# Patient Record
Sex: FEMALE | Race: ASIAN | Employment: OTHER | URBAN - METROPOLITAN AREA
[De-identification: names, ages, dates, MRNs, and addresses within clinical notes are randomized per-mention and may not be internally consistent; named-entity substitution may affect disease eponyms.]

---

## 2020-01-02 ENCOUNTER — OFFICE VISIT - HEALTHEAST (OUTPATIENT)
Dept: INTERNAL MEDICINE | Facility: CLINIC | Age: 55
End: 2020-01-02

## 2020-01-02 DIAGNOSIS — Z12.31 VISIT FOR SCREENING MAMMOGRAM: ICD-10-CM

## 2020-01-02 DIAGNOSIS — R10.13 EPIGASTRIC PAIN: ICD-10-CM

## 2020-01-02 DIAGNOSIS — R63.4 WEIGHT LOSS: ICD-10-CM

## 2020-01-02 DIAGNOSIS — Z12.11 SCREEN FOR COLON CANCER: ICD-10-CM

## 2020-01-02 DIAGNOSIS — R14.0 BLOATING: ICD-10-CM

## 2020-01-02 LAB
ALBUMIN SERPL-MCNC: 4.8 G/DL (ref 3.5–5)
ALP SERPL-CCNC: 75 U/L (ref 45–120)
ALT SERPL W P-5'-P-CCNC: 13 U/L (ref 0–45)
AMYLASE SERPL-CCNC: 72 U/L (ref 5–120)
ANION GAP SERPL CALCULATED.3IONS-SCNC: 13 MMOL/L (ref 5–18)
AST SERPL W P-5'-P-CCNC: 18 U/L (ref 0–40)
BASOPHILS # BLD AUTO: 0 THOU/UL (ref 0–0.2)
BASOPHILS NFR BLD AUTO: 0 % (ref 0–2)
BILIRUB SERPL-MCNC: 0.6 MG/DL (ref 0–1)
BUN SERPL-MCNC: 19 MG/DL (ref 8–22)
C REACTIVE PROTEIN LHE: <0.1 MG/DL (ref 0–0.8)
CALCIUM SERPL-MCNC: 10.1 MG/DL (ref 8.5–10.5)
CHLORIDE BLD-SCNC: 105 MMOL/L (ref 98–107)
CO2 SERPL-SCNC: 23 MMOL/L (ref 22–31)
CREAT SERPL-MCNC: 0.82 MG/DL (ref 0.6–1.1)
EOSINOPHIL # BLD AUTO: 0 THOU/UL (ref 0–0.4)
EOSINOPHIL NFR BLD AUTO: 0 % (ref 0–6)
ERYTHROCYTE [DISTWIDTH] IN BLOOD BY AUTOMATED COUNT: 11.9 % (ref 11–14.5)
ERYTHROCYTE [SEDIMENTATION RATE] IN BLOOD BY WESTERGREN METHOD: 13 MM/HR (ref 0–20)
GFR SERPL CREATININE-BSD FRML MDRD: >60 ML/MIN/1.73M2
GLUCOSE BLD-MCNC: 132 MG/DL (ref 70–125)
HCT VFR BLD AUTO: 38.3 % (ref 35–47)
HGB BLD-MCNC: 13.2 G/DL (ref 12–16)
LIPASE SERPL-CCNC: 30 U/L (ref 0–52)
LYMPHOCYTES # BLD AUTO: 2.2 THOU/UL (ref 0.8–4.4)
LYMPHOCYTES NFR BLD AUTO: 30 % (ref 20–40)
MCH RBC QN AUTO: 31.7 PG (ref 27–34)
MCHC RBC AUTO-ENTMCNC: 34.5 G/DL (ref 32–36)
MCV RBC AUTO: 92 FL (ref 80–100)
MONOCYTES # BLD AUTO: 0.3 THOU/UL (ref 0–0.9)
MONOCYTES NFR BLD AUTO: 5 % (ref 2–10)
NEUTROPHILS # BLD AUTO: 4.6 THOU/UL (ref 2–7.7)
NEUTROPHILS NFR BLD AUTO: 65 % (ref 50–70)
PLATELET # BLD AUTO: 307 THOU/UL (ref 140–440)
PMV BLD AUTO: 9.7 FL (ref 8.5–12.5)
POTASSIUM BLD-SCNC: 3.9 MMOL/L (ref 3.5–5)
PROT SERPL-MCNC: 8 G/DL (ref 6–8)
RBC # BLD AUTO: 4.17 MILL/UL (ref 3.8–5.4)
SODIUM SERPL-SCNC: 141 MMOL/L (ref 136–145)
TSH SERPL DL<=0.005 MIU/L-ACNC: 1.08 UIU/ML (ref 0.3–5)
WBC: 7.2 THOU/UL (ref 4–11)

## 2020-01-02 ASSESSMENT — MIFFLIN-ST. JEOR: SCORE: 1010.47

## 2020-01-03 LAB — HBV SURFACE AG SERPL QL IA: NEGATIVE

## 2020-01-06 LAB — H PYLORI AG STL QL IA: NEGATIVE

## 2020-01-09 ENCOUNTER — OFFICE VISIT - HEALTHEAST (OUTPATIENT)
Dept: INTERNAL MEDICINE | Facility: CLINIC | Age: 55
End: 2020-01-09

## 2020-01-09 DIAGNOSIS — F41.1 GAD (GENERALIZED ANXIETY DISORDER): ICD-10-CM

## 2020-01-09 DIAGNOSIS — K30 FUNCTIONAL DYSPEPSIA: ICD-10-CM

## 2020-01-09 DIAGNOSIS — Z78.0 MENOPAUSE: ICD-10-CM

## 2020-03-31 ENCOUNTER — OFFICE VISIT - HEALTHEAST (OUTPATIENT)
Dept: INTERNAL MEDICINE | Facility: CLINIC | Age: 55
End: 2020-03-31

## 2020-03-31 DIAGNOSIS — K29.50 CHRONIC GASTRITIS WITHOUT BLEEDING, UNSPECIFIED GASTRITIS TYPE: ICD-10-CM

## 2020-03-31 DIAGNOSIS — R10.13 ABDOMINAL PAIN, EPIGASTRIC: ICD-10-CM

## 2020-03-31 DIAGNOSIS — R68.81 EARLY SATIETY: ICD-10-CM

## 2020-03-31 DIAGNOSIS — R63.4 WEIGHT LOSS: ICD-10-CM

## 2020-04-07 ENCOUNTER — RECORDS - HEALTHEAST (OUTPATIENT)
Dept: ADMINISTRATIVE | Facility: OTHER | Age: 55
End: 2020-04-07

## 2020-04-14 ENCOUNTER — RECORDS - HEALTHEAST (OUTPATIENT)
Dept: ADMINISTRATIVE | Facility: OTHER | Age: 55
End: 2020-04-14

## 2020-04-28 ENCOUNTER — RECORDS - HEALTHEAST (OUTPATIENT)
Dept: ADMINISTRATIVE | Facility: OTHER | Age: 55
End: 2020-04-28

## 2020-07-07 ENCOUNTER — AMBULATORY - HEALTHEAST (OUTPATIENT)
Dept: INTERNAL MEDICINE | Facility: CLINIC | Age: 55
End: 2020-07-07

## 2021-03-04 ENCOUNTER — DOCUMENTATION ONLY (OUTPATIENT)
Dept: CARE COORDINATION | Facility: CLINIC | Age: 56
End: 2021-03-04

## 2021-03-07 ENCOUNTER — HEALTH MAINTENANCE LETTER (OUTPATIENT)
Age: 56
End: 2021-03-07

## 2021-03-08 ENCOUNTER — TELEPHONE (OUTPATIENT)
Dept: DERMATOLOGY | Facility: CLINIC | Age: 56
End: 2021-03-08

## 2021-03-08 ENCOUNTER — VIRTUAL VISIT (OUTPATIENT)
Dept: DERMATOLOGY | Facility: CLINIC | Age: 56
End: 2021-03-08
Payer: COMMERCIAL

## 2021-03-08 DIAGNOSIS — B02.9 HERPES ZOSTER WITHOUT COMPLICATION: Primary | ICD-10-CM

## 2021-03-08 PROCEDURE — 99204 OFFICE O/P NEW MOD 45 MIN: CPT | Mod: 95 | Performed by: DERMATOLOGY

## 2021-03-08 RX ORDER — VALACYCLOVIR HYDROCHLORIDE 1 G/1
1000 TABLET, FILM COATED ORAL 3 TIMES DAILY
Qty: 21 TABLET | Refills: 1 | Status: SHIPPED | OUTPATIENT
Start: 2021-03-08 | End: 2021-09-21

## 2021-03-08 RX ORDER — VALACYCLOVIR HYDROCHLORIDE 1 G/1
1000 TABLET, FILM COATED ORAL 2 TIMES DAILY
Qty: 14 TABLET | Refills: 1 | Status: SHIPPED | OUTPATIENT
Start: 2021-03-08 | End: 2021-03-08

## 2021-03-08 ASSESSMENT — PAIN SCALES - GENERAL: PAINLEVEL: NO PAIN (0)

## 2021-03-08 NOTE — LETTER
3/8/2021       RE: Donna Hays  [address]  [facility] [address]     Dear Colleague,    Thank you for referring your patient, Donna Hays, to the [facility] [place] at [facility]. Please see a copy of my visit note below.    [facility] Dermatology Note  Encounter Date: Mar 8, 2021  Store-and-Forward and Telephone ([phone]). Location of teledermatologist: [facility] [place].  Start time: 12:53. End time: 1:17.    Dermatology Problem List:  1. Suspected herpes zoster: on right posterior thigh; valacyclovir 1000 mg TID x7 days    ____________________________________________    Assessment & Plan:     1. Herpetic eruption on thigh: given concomitant eruption on buttock, suspect interrupted dermatomal distribution of zoster over simplex, so will treat at higher dose valacyclovir. Discussed what is known about pathophysiology and risk of recurrence. Advised against use of topical acyclovir as it is generally ineffective.  - start valacyclovir 1000 mg TID x7 days. If still present, can extend course an additional 7 days    Procedures Performed:    None    Follow-up: PRN    Staff:     Brett Wick MD   of Dermatology  Department of Dermatology  [facility]    ____________________________________________    CC: Derm Problem (Donna is concerned about a rash on her right thigh and buttock.)    HPI:  Ms. Donna Hays is a(n) 55 year old female who presents today as a new patient for rash    Rash started 8-9 days ago with small red spot on thigh - itchy  - few days later, developed blisters  - 2 days started Chinese herb cream  - not painful  - also has similar rash on same side on buttock    Patient is otherwise feeling well, without additional skin concerns.    Labs Reviewed:  N/A    Physical Exam:  Vitals: There were no vitals taken  for this visit.  SKIN: Teledermatology photos were reviewed; image quality and interpretability: acceptable. Image date: 3/3/21, 3/6/21.  - grouped erythematous vesicles/shallow erosions on the posterior thigh  - No other lesions of concern on areas examined.     Medications:  No current outpatient medications on file.     No current facility-administered medications for this visit.       Past Medical/Surgical History:   There is no problem list on file for this patient.    No past medical history on file.    CC Beto Marie MD  No address on file on close of this encounter.      Again, thank you for allowing me to participate in the care of your patient.      Sincerely,    Brett Wick MD

## 2021-03-08 NOTE — TELEPHONE ENCOUNTER
[facility] Call Center    Phone Message    May a detailed message be left on voicemail: yes     Reason for Call: Medication Question or concern regarding medication   Prescription Clarification  Name of Medication: valACYclovir (VALTREX) 1000 mg tablet  Prescribing Provider: Dr. Wick   Pharmacy: [facility]   What on the order needs clarification? Pharm called and needs some clarification on this medication. They received two prescription with two different directions. Please call them back to discuss. Thanks      Action Taken: Message routed to:  [facility] DERM    Travel Screening: Not Applicable

## 2021-03-08 NOTE — LETTER
Date:March 22, 2021      Patient was self referred, no letter generated. Do not send.        [facility] Health Information

## 2021-03-08 NOTE — PROGRESS NOTES
[facility] Dermatology Note  Encounter Date: Mar 8, 2021  Store-and-Forward and Telephone ([phone]). Location of teledermatologist: [facility] [place].  Start time: 12:53. End time: 1:17.    Dermatology Problem List:  1. Suspected herpes zoster: on right posterior thigh; valacyclovir 1000 mg TID x7 days    ____________________________________________    Assessment & Plan:     1. Herpetic eruption on thigh: given concomitant eruption on buttock, suspect interrupted dermatomal distribution of zoster over simplex, so will treat at higher dose valacyclovir. Discussed what is known about pathophysiology and risk of recurrence. Advised against use of topical acyclovir as it is generally ineffective.  - start valacyclovir 1000 mg TID x7 days. If still present, can extend course an additional 7 days    Procedures Performed:    None    Follow-up: PRN    Staff:     Brett Wick MD   of Dermatology  Department of Dermatology  [facility]    ____________________________________________    CC: Derm Problem (Donna is concerned about a rash on her right thigh and buttock.)    HPI:  Ms. Donna Hays is a(n) 55 year old female who presents today as a new patient for rash    Rash started 8-9 days ago with small red spot on thigh - itchy  - few days later, developed blisters  - 2 days started Chinese herb cream  - not painful  - also has similar rash on same side on buttock    Patient is otherwise feeling well, without additional skin concerns.    Labs Reviewed:  N/A    Physical Exam:  Vitals: There were no vitals taken for this visit.  SKIN: Teledermatology photos were reviewed; image quality and interpretability: acceptable. Image date: 3/3/21, 3/6/21.  - grouped erythematous vesicles/shallow erosions on the posterior thigh  - No other lesions of concern on areas examined.     Medications:  No current outpatient  medications on file.     No current facility-administered medications for this visit.       Past Medical/Surgical History:   There is no problem list on file for this patient.    No past medical history on file.    CC Referred Self, MD  No address on file on close of this encounter.

## 2021-03-08 NOTE — PATIENT INSTRUCTIONS
[facility] Dermatology Visit    Thank you for allowing us to participate in your care. Your findings, instructions and follow-up plan are as follows:     Start valacyclovir 1000 mg three times daily. If still present at one week, get refill and contact me by MyChart.    When should I call my doctor?    If you are worsening or not improving, please, contact us or seek urgent care as noted below.     Who should I call with questions (adults)?    [facility] (adult and pediatric): [phone]     [facility] (adult): [phone]    For urgent needs outside of business hours call the [facility] at [phone] and ask for the dermatology resident on call    If this is a medical emergency and you are unable to reach an ER, Call [phone]      Who should I call with questions (pediatric)?  [facility]- Pediatric Dermatology  Dr. Deandra Giraldo, Dr. Yuriy Cisse, Dr. Sury Hooks, Skylar Patel, PA  Dr. Michaela Louis, Dr. Oliva Zhao & Dr. Brett Garrett  Non Urgent  Nurse Triage Line; [phone]- Susana and Joyce RN Care Coordinators   [place] (/Complex ) [phone]    If you need a prescription refill, please contact your pharmacy. Refills are approved or denied by our Physicians during normal business hours, Monday through Fridays  Per office policy, refills will not be granted if you have not been seen within the past year (or sooner depending on your child's condition)    Scheduling Information:  Pediatric Appointment Scheduling and Call Center [phone]  Radiology Scheduling- [phone]  Sedation Unit Scheduling- [phone]  [place] Scheduling- General [phone]; Pediatric Dermatology [phone]  Main  Services: [phone]  Macanese: [phone]  Cypriot: [phone]  Hmong/Beninese/Chilean: [phone]  Preadmission Nursing Department Fax  Number: [phone] (Fax all pre-operative paperwork to this number)    For urgent matters arising during evenings, weekends, or holidays that cannot wait for normal business hours please call [phone] and ask for the Dermatology Resident On-Call to be paged.

## 2021-03-08 NOTE — TELEPHONE ENCOUNTER
valACYclovir (VALTREX) 1000 mg tablet 21 tablet 1 3/8/2021  No   Sig - Route: Take 1 tablet (1,000 mg) by mouth 3 times daily - Oral   Sent to pharmacy as: valACYclovir HCl 1 GM Oral Tablet (VALTREX)   Class: E-Prescribe   Order: [id number]   E-Prescribing Status: Receipt confirmed by pharmacy (3/8/2021  1:06 PM CST)

## 2021-06-04 VITALS
TEMPERATURE: 98.1 F | BODY MASS INDEX: 19.86 KG/M2 | OXYGEN SATURATION: 99 % | DIASTOLIC BLOOD PRESSURE: 78 MMHG | HEART RATE: 80 BPM | SYSTOLIC BLOOD PRESSURE: 158 MMHG | WEIGHT: 105.1 LBS

## 2021-06-04 VITALS
SYSTOLIC BLOOD PRESSURE: 148 MMHG | HEART RATE: 103 BPM | BODY MASS INDEX: 19.9 KG/M2 | WEIGHT: 105.4 LBS | HEIGHT: 61 IN | TEMPERATURE: 98.1 F | DIASTOLIC BLOOD PRESSURE: 86 MMHG | OXYGEN SATURATION: 98 %

## 2021-06-04 NOTE — PROGRESS NOTES
Office Visit - New Patient   Donna Hays   54 y.o.  female    Date of visit: 1/2/2020  Physician: Jose M Deleon MD     Assessment and Plan     1.  54-year-old Chinese-speaking lady, who comes in for evaluation of 2 to 3 years of upper abdominal symptoms.  These consist of postprandial bloating, early satiety, heartburn, sour reflux, and losing maybe about 6 or 7 pounds over 2 years.    I am concerned about the possibility of helicobacter pylori, peptic ulcer disease, chronic reflux, as the main possibilities.    Let's start the investigation with a comprehensive metabolic panel (includes liver and kidney function, blood sugar, electrolytes), amylase, lipase, blood cell counts with differential, thyroid cascade, and stool test for H. pylori antigen.    I told her that once I see the results of those tests, I will probably proceed with the next step of an upper endoscopy to examine her esophagus, stomach, and proximal small intestine.  I do believe that we should take a more aggressive testing approach, since her symptoms have been going on for a couple of years and she has lost some weight.    I would like her to come back to the clinic to see me in 1 to 2 weeks, so we can review test results, and decide on next steps.    I would like to hold off on any medications for now until we have our test results back.  In particular, I do not want to start her on any proton pump inhibitor until we have the stool test done.    2.  Post menopause, should eventually get routine preventive services including screening mammography, gynecologic exam with Pap smear, and bone density measurement.    3.  Blood pressure bit elevated today, probably because she is nervous.         Chief Complaint   Chief Complaint   Patient presents with     Establish Care     Bloated     Heartburn     x 2-3 months        History of Present Illness   This 54 y.o. old Chinese-speaking lady, who comes in for evaluation of 2 to 3 years of upper  "abdominal symptoms.  These consist of postprandial bloating, early satiety, heartburn, sour reflux, and losing maybe about 6 or 7 pounds over 2 years.    Insidious onset of symptoms, about 2 to 3 years ago.  Has been persistent since then.  The main symptom is postprandial bloating, where she describes feeling a very full sensation in the epigastrium after eating just a few bites.  That has a lot of burping, also sometimes sour reflux.  Stool habit she says has been normal.      She reports that she used to weigh about 112 pounds, and today we measured her at 105 pounds.    She comes from [place], where she was a  in [employer].    Other symptoms: Skin feels cold  Aches and pains in back hips, knees.    Menopause 2012, age 47    Works as  in [employer]    No smoking, no alcohol      Review of Systems: A comprehensive review of systems was negative except as noted.     Medications and Allergies   No current outpatient medications on file.     No current facility-administered medications for this visit.      No Known Allergies     Family and Social History   Family History   Problem Relation Age of Onset     Colon cancer Mother      Esophageal cancer Father      Colon cancer Other      Colon cancer Maternal Uncle         Social History     Tobacco Use     Smoking status: Never Smoker     Smokeless tobacco: Never Used   Substance Use Topics     Alcohol use: Not on file     Drug use: Not on file        Physical Exam   General Appearance:   Very pleasant, appears well, but she is thin, probably a little underweight..    /86 (Patient Site: Right Arm, Patient Position: Sitting, Cuff Size: Adult Regular)   Pulse (!) 103   Temp 98.1  F (36.7  C) (Oral)   Ht 5' 1\" (1.549 m)   Wt 105 lb 6.4 oz (47.8 kg)   SpO2 98%   BMI 19.92 kg/m      General: Alert, in no distress  Skin: No significant lesion seen.  Pale?  Eyes/nose/throat: Eyes without scleral icterus, eye movements normal, " pupils equal and reactive, oropharynx clear, ears with normal TM's  MSK: Neck with good ROM  Lymphatic: Neck without adenopathy or masses  Endocrine: Thyroid with no nodules to palpation  Pulm: Lungs clear to auscultation bilaterally  Cardiac: Heart with regular rate and rhythm, no murmur or gallop  GI: Abdomen soft, nontender. No palpable enlargement of liver or spleen  MSK: Extremities no tenderness or edema  Neuro: Moves all extremities, without focal weakness  Psych: Alert, normal mental status. Normal affect and speech       Additional Information        Jose M Deleon MD  Internal Medicine

## 2021-06-04 NOTE — PATIENT INSTRUCTIONS - HE
1.  54-year-old Chinese lady, who comes in for evaluation of 2 to 3 years of upper abdominal symptoms.  These consist of postprandial bloating, early satiety, heartburn, sour reflux, and losing maybe about 6 or 7 pounds over 2 years.    I am concerned about the possibility of helicobacter pylori, peptic ulcer disease, chronic reflux, as the main possibilities.    Let's start the investigation with a comprehensive metabolic panel (includes liver and kidney function, blood sugar, electrolytes), amylase, lipase, blood cell counts with differential, thyroid cascade, and stool test for H. pylori antigen.    I told her that once I see the results of those tests, I will probably proceed with the next step of an upper endoscopy to examine her esophagus, stomach, and proximal small intestine.  I do believe that we should take a more aggressive testing approach, since her symptoms have been going on for a couple of years and she has lost some weight.    I would like her to come back to the clinic to see me in 1 to 2 weeks, so we can review test results, and decide on next steps.    I would like to hold off on any medications for now until we have our test results back.  In particular, I do not want to start her on any proton pump inhibitor until we have the stool test done.    2.  Post menopause, should eventually get routine preventive services including screening mammography, gynecologic exam with Pap smear, and bone density measurement.

## 2021-06-05 NOTE — PATIENT INSTRUCTIONS - HE
1.  Persistent epigastric discomfort and dyspepsia symptoms, with no biochemical abnormalities or inflammatory markers demonstrated on blood tests that we performed on January 2, 2020. We will attempt a trial of proton pump inhibitor omeprazole 20 mg once a day to suppress acid. If symptoms not adequately relieved, will pursue upper endoscopy.    I reviewed with her the test results from January 2, which showed completely normal blood cell counts, no anemia, negative inflammatory markers (sedimentation rate and C-reactive protein), normal liver chemistry tests, and normal pancreatic enzymes (amylase and lipase).    She is experiencing the same symptoms of postprandial epigastric discomfort, which tends to sometimes be in the left upper quadrant.  Since is on the left side, I doubt this is gallstones.    I explained to her how the omeprazole will suppress acid and is an experiment to see how her symptoms respond.  If no relief, I would want her to get fiberoptic upper endoscopy to examine esophagus, stomach, and proximal small intestine.    There may also be a component of anxiety that is contributing to her symptoms.    2.  Longstanding anxiety symptoms, I suspect generalized anxiety disorder and agoraphobia    She told me that she has had anxiety symptoms for maybe 3 decades.  She will experience sudden periods of anxiety that could be panic attacks.  She is never been on medication for this issue.    I think it would be worth a trial of medication and I have prescribed for her escitalopram at a starting dose of 10 mg once a day.  I told her that side effects are generally mild and temporary, lasting a few weeks, and might consist of disturbances to sleep, may be some gastrointestinal symptoms, may be mild headaches.  But in general these medications are well tolerated.    I would like her to return to the clinic to see me in 2 months, and we will assess the effects of the escitalopram, and possibly adjust the  dose.    She consumes 0 alcohol.    3.  Post menopause, history of what sounds like osteopenia.  She recalls having have a bone density measurement done in [place] about a decade ago, and was told that bone density was on the low side.  I have ordered a DEXA scan for her.  At the minimum, I would like her to start taking a calcium supplement every day, In the form of calcium citrate with vitamin D 1000 mg daily.    4.  Slightly elevated glucose noted on January 2 blood test.  Glucose was 132, (reference range up to 125).  When I see her back in 2 months, I would like it to be a morning appointment.  Come fasting, before breakfast.  We will recheck her fasting glucose and also A1c test.    She told me that she feels nervous.    5.  Elevated systolic blood pressure again noted today, probably anxiety.     6.  Other preventive services that we should eventually consider are mammography, colonoscopy, and gynecology (Pap smear).

## 2021-06-05 NOTE — PROGRESS NOTES
Office Visit - Follow Up   Donna Hays   54 y.o. female    Date of Visit: 1/9/2020    Chief Complaint   Patient presents with     Follow-up     Abdominal symptoms unchanged        Assessment and Plan     1.  Persistent epigastric discomfort and dyspepsia symptoms, with no biochemical abnormalities or inflammatory markers demonstrated on blood tests that we performed on January 2, 2020. We will attempt a trial of proton pump inhibitor omeprazole 20 mg once a day to suppress acid. If symptoms not adequately relieved, will pursue upper endoscopy.    I reviewed with her the test results from January 2, which showed completely normal blood cell counts, no anemia, negative inflammatory markers (sedimentation rate and C-reactive protein), normal liver chemistry tests, and normal pancreatic enzymes (amylase and lipase).    She is experiencing the same symptoms of postprandial epigastric discomfort, which tends to sometimes be in the left upper quadrant.  Since is on the left side, I doubt this is gallstones.    I explained to her how the omeprazole will suppress acid and is an experiment to see how her symptoms respond.  If no relief, I would want her to get fiberoptic upper endoscopy to examine esophagus, stomach, and proximal small intestine.    There may also be a component of anxiety that is contributing to her symptoms.    2.  Longstanding anxiety symptoms, I suspect generalized anxiety disorder and agoraphobia    She told me that she has had anxiety symptoms for maybe 3 decades.  She will experience sudden periods of anxiety that could be panic attacks.  She is never been on medication for this issue.    I think it would be worth a trial of medication and I have prescribed for her escitalopram at a starting dose of 10 mg once a day.  I told her that side effects are generally mild and temporary, lasting a few weeks, and might consist of disturbances to sleep, may be some gastrointestinal symptoms, may be mild  headaches.  But in general these medications are well tolerated.    I would like her to return to the clinic to see me in 2 months, and we will assess the effects of the escitalopram, and possibly adjust the dose.    She consumes 0 alcohol.    3.  Post menopause, history of what sounds like osteopenia.  She recalls having have a bone density measurement done in [place] about a decade ago, and was told that bone density was on the low side.  I have ordered a DEXA scan for her.  At the minimum, I would like her to start taking a calcium supplement every day, In the form of calcium citrate with vitamin D 1000 mg daily.    4.  Slightly elevated glucose noted on January 2 blood test.  Glucose was 132, (reference range up to 125).  When I see her back in 2 months, I would like it to be a morning appointment.  Come fasting, before breakfast.  We will recheck her fasting glucose and also A1c test.    She told me that she feels nervous.    5.  Elevated systolic blood pressure again noted today, probably anxiety.     6.  Other preventive services that we should eventually consider are mammography, colonoscopy, and gynecology (Pap smear).         History of Present Illness   This 54 y.o. old follow-up for epigastric discomfort, dyspepsia symptoms, and discussion of a new issue, longstanding anxiety.  Review test results    Today's visit is again assisted by Mandarin Chinese     Initial visit with me 1-2-19    Epigastric postprandial symptoms are the same.  I reviewed with her all of the blood test, which came back normal.  Except for slight elevation of glucose.  Stool H. pylori was negative.    During our discussion today, she revealed that she has had a lot of problems with anxiety, the go back at least 30 years.  It sounds like she has had panic attacks and Agoraphobia.  She is never been on medication for this.  Denies symptoms of depression.  Consumes 0 alcohol.       Medications, Allergies, Social, and Problem  List   No current outpatient medications on file.     No current facility-administered medications for this visit.      No Known Allergies  Social History     Tobacco Use     Smoking status: Never Smoker     Smokeless tobacco: Never Used   Substance Use Topics     Alcohol use: Not on file     Drug use: Not on file     There is no problem list on file for this patient.       Reviewed, reconciled and updated       Physical Exam   General Appearance: Pleasant, alert, smiles easily, in no distress today.    /78 (Patient Site: Right Arm, Patient Position: Sitting, Cuff Size: Adult Regular)   Pulse 80   Temp 98.1  F (36.7  C) (Oral)   Wt 105 lb 1.6 oz (47.7 kg)   SpO2 99%   BMI 19.86 kg/m         Additional Information   I spent 25 minutes face time with the patient, with > 50% counseling, explaining and discussing with the patient the issues enumerated in the Assessment and Plan section of this note and answering questions. Afterwards, the patient was given a printout of the AVS, with attention to the content in the Patient Instruction section.       Jose M Deleon MD

## 2021-06-07 NOTE — PROGRESS NOTES
"Donna Hays is a 54 y.o. female who is being evaluated via a billable telephone visit.      The patient has been notified of following:     \"This telephone visit will be conducted via a call between you and your physician/provider. We have found that certain health care needs can be provided without the need for a physical exam.  This service lets us provide the care you need with a short phone conversation.  If a prescription is necessary we can send it directly to your pharmacy.  If lab work is needed we can place an order for that and you can then stop by our lab to have the test done at a later time.    If during the course of the call the physician/provider feels a telephone visit is not appropriate, you will not be charged for this service.\"     Patient has given verbal consent to a Telephone visit? Yes    Donna Hays complains of    Ongoing symptoms of epigastric discomfort, early satiety, and weight loss of another 2 or 3 pounds, since our in clinic visit of January 9, 2020.    Today's visit was assisted by the drew  via telephone.    Ms. Hays reports able to eat only small amounts of chicken, fish, or vegetables.  She says she is lost another 2 or 3 pounds.  The omeprazole does seem to help.  I told her that even though her laboratory work from January were normal, including H. pylori testing, her symptoms are quite concerning and we need to proceed with getting upper endoscopy.  She was concerned about the covert 19 situation, and I told her that the procedure still needs to be done in the next month or so, and that it can be done safely.    She told me that the Lexapro that I prescribed for anxiety symptoms unfortunately caused side effect of making her pass out.  Therefore I taken that medication off her medication list.      I have reviewed and updated the patient's Past Medical History, Social History, Family History and Medication List.    ALLERGIES  Patient has no known " allergies.    Assessment/Plan:     Persistent epigastric discomfort, dyspepsia symptoms, weight loss, and early satiety.    No biochemical abnormalities or inflammatory markers demonstrated on blood tests that we performed on January 2, 2020.  Prolonged course of omeprazole 20 mg a day has not controlled her symptoms.     Test results from January 2, showed completely normal blood cell counts, no anemia, negative inflammatory markers (sedimentation rate and C-reactive protein), normal liver chemistry tests, and normal pancreatic enzymes (amylase and lipase).     She is experiencing the same symptoms of postprandial epigastric discomfort, which tends to sometimes be in the left upper quadrant.  Since is on the left side, I doubt this is gallstones.     I told her that even though her laboratory work from January were normal, including H. pylori testing, her symptoms are quite concerning and we need to proceed with getting upper endoscopy.  She was concerned about the covert 19 situation, and I told her that the procedure still needs to be done in the next month or so, and that it can be done safely.     Longstanding anxiety symptoms, I suspect generalized anxiety disorder and agoraphobia; side effect of fainting when she tried Lexapro    She told me that she has had anxiety symptoms for maybe 3 decades.  She will experience sudden periods of anxiety that could be panic attacks.  She is never been on medication for this issue.    I taken Lexapro off her medication list    She consumes 0 alcohol.     Post menopause, history of what sounds like osteopenia.  She recalls having have a bone density measurement done in [place] about a decade ago, and was told that bone density was on the low side.  I have ordered a DEXA scan for her.  At the minimum, I would like her to start taking a calcium supplement every day, In the form of calcium citrate with vitamin D 1000 mg daily.     Slightly elevated glucose noted on January 2  blood test.  Glucose was 132, (reference range up to 125).  When I see her back in 2 months, I would like it to be a morning appointment.  Come fasting, before breakfast.  We will recheck her fasting glucose and also A1c test.     She told me that she feels nervous.     Elevated systolic blood pressure, probably anxiety.      Other preventive services that we should eventually consider are mammography, colonoscopy, and gynecology (Pap smear).     Phone call duration:  21 minutes    [facility]

## 2021-06-07 NOTE — PROGRESS NOTES
THE FOLLOWING TEXT WAS CREATED FOR PRE-CHARTING PURPOSES AND CONSISTS OF EXCERPTS OF THE MEDICAL RECORD ([facility] AND [facility]IDE)

## 2021-06-16 PROBLEM — R68.81 EARLY SATIETY: Status: ACTIVE | Noted: 2020-03-31

## 2021-06-16 PROBLEM — K30 FUNCTIONAL DYSPEPSIA: Status: ACTIVE | Noted: 2020-01-09

## 2021-06-16 PROBLEM — Z78.0 MENOPAUSE: Status: ACTIVE | Noted: 2020-01-09

## 2021-06-16 PROBLEM — R10.13 ABDOMINAL PAIN, EPIGASTRIC: Status: ACTIVE | Noted: 2020-03-31

## 2021-06-16 PROBLEM — R63.4 WEIGHT LOSS: Status: ACTIVE | Noted: 2020-03-31

## 2021-06-16 PROBLEM — F41.1 GAD (GENERALIZED ANXIETY DISORDER): Status: ACTIVE | Noted: 2020-01-09

## 2021-07-03 NOTE — ADDENDUM NOTE
Addendum Note by Rosemary Gentile at 1/2/2020  3:00 PM     Author: Rosemary Gentile Service: -- Author Type:     Filed: 1/2/2020  7:09 PM Encounter Date: 1/2/2020 Status: Signed    : Rosemary Gentile ()    Addended by: ROSEMARY GENTILE on: 1/2/2020 07:09 PM        Modules accepted: Orders

## 2021-08-20 ENCOUNTER — MYC MEDICAL ADVICE (OUTPATIENT)
Dept: INTERNAL MEDICINE | Facility: CLINIC | Age: 56
End: 2021-08-20

## 2021-08-20 DIAGNOSIS — R30.0 DYSURIA: Primary | ICD-10-CM

## 2021-08-24 ENCOUNTER — LAB (OUTPATIENT)
Dept: LAB | Facility: CLINIC | Age: 56
End: 2021-08-24
Payer: COMMERCIAL

## 2021-08-24 DIAGNOSIS — R30.0 DYSURIA: ICD-10-CM

## 2021-08-24 LAB
ALBUMIN UR-MCNC: NEGATIVE MG/DL
APPEARANCE UR: CLEAR
BACTERIA #/AREA URNS HPF: ABNORMAL /HPF
BILIRUB UR QL STRIP: NEGATIVE
COLOR UR AUTO: YELLOW
GLUCOSE UR STRIP-MCNC: NEGATIVE MG/DL
HGB UR QL STRIP: ABNORMAL
KETONES UR STRIP-MCNC: NEGATIVE MG/DL
LEUKOCYTE ESTERASE UR QL STRIP: ABNORMAL
NITRATE UR QL: NEGATIVE
PH UR STRIP: 6 [PH] (ref 5–8)
RBC #/AREA URNS AUTO: ABNORMAL /HPF
SP GR UR STRIP: 1.02 (ref 1–1.03)
SQUAMOUS #/AREA URNS AUTO: ABNORMAL /LPF
UROBILINOGEN UR STRIP-ACNC: 0.2 E.U./DL
WBC #/AREA URNS AUTO: ABNORMAL /HPF

## 2021-08-24 PROCEDURE — 81001 URINALYSIS AUTO W/SCOPE: CPT

## 2021-08-25 ENCOUNTER — TELEPHONE (OUTPATIENT)
Dept: INTERNAL MEDICINE | Facility: CLINIC | Age: 56
End: 2021-08-25

## 2021-08-25 NOTE — TELEPHONE ENCOUNTER
Reason for Call:  Other appointment    Detailed comments: Patients daughter was left a message to get her mother in to see Dr. Deleon for a follow up on test results, and the next appointment with him is the 21st of September, can she be put in sometime earlier?    Phone Number Patient can be reached at: Home number on file [phone]   [place]  (home)    Best Time: anytime    Can we leave a detailed message on this number? YES    Call taken on 8/25/2021 at 4:11 PM by Melina Araiza

## 2021-09-21 ENCOUNTER — OFFICE VISIT (OUTPATIENT)
Dept: INTERNAL MEDICINE | Facility: CLINIC | Age: 56
End: 2021-09-21
Payer: COMMERCIAL

## 2021-09-21 VITALS
DIASTOLIC BLOOD PRESSURE: 80 MMHG | OXYGEN SATURATION: 98 % | BODY MASS INDEX: 17.72 KG/M2 | HEART RATE: 98 BPM | SYSTOLIC BLOOD PRESSURE: 132 MMHG | TEMPERATURE: 98.7 F | WEIGHT: 93.8 LBS

## 2021-09-21 DIAGNOSIS — F41.1 GAD (GENERALIZED ANXIETY DISORDER): ICD-10-CM

## 2021-09-21 DIAGNOSIS — F33.2 SEVERE EPISODE OF RECURRENT MAJOR DEPRESSIVE DISORDER, WITHOUT PSYCHOTIC FEATURES (H): Primary | ICD-10-CM

## 2021-09-21 DIAGNOSIS — K30 FUNCTIONAL DYSPEPSIA: ICD-10-CM

## 2021-09-21 DIAGNOSIS — R63.4 WEIGHT LOSS: ICD-10-CM

## 2021-09-21 DIAGNOSIS — M79.7 FIBROMYALGIA: ICD-10-CM

## 2021-09-21 DIAGNOSIS — R68.81 EARLY SATIETY: ICD-10-CM

## 2021-09-21 PROCEDURE — 99214 OFFICE O/P EST MOD 30 MIN: CPT | Performed by: INTERNAL MEDICINE

## 2021-09-21 RX ORDER — MIRTAZAPINE 15 MG/1
15 TABLET, FILM COATED ORAL AT BEDTIME
Qty: 30 TABLET | Refills: 11 | Status: SHIPPED | OUTPATIENT
Start: 2021-09-21

## 2021-09-21 NOTE — PROGRESS NOTES
Office Visit - Follow Up   Donna Hays   56 year old female    Date of Visit: 9/21/2021    Chief Complaint   Patient presents with     Results        -------------------------------------------------------------------------------------------------------------------------  Assessment and Plan    Follow-up for multiple issues, our last meeting was in the spring 2020, and since then she has had ongoing symptoms of epigastric discomfort, all over body aches, back pain, abdominal pain, early satiety, and has continued to lose weight.  Today January 21 her weight is dropped to 93 pounds (42.5 kg) down from previous 105 pounds (47.7 kg).    I reviewed with her the extensive work-up performed in the spring 2020 here at [facility] and also at [place] [facility].    I believe that there are serious underlying issues of depression and anxiety.  She told me that she has been able to sleep only a couple hours at night, and this is been going on for months.  Furthermore, her symptoms of all over body aches would fit with fibromyalgia syndrome.    She told me today September 21, 2021 that she will be relocating to [facility] soon.  I am going to run a set of blood test today to make sure her core body systems are okay.  We will get a comprehensive metabolic panel, blood cell counts, thyroid cascade, sedimentation rate, C-reactive protein, and vitamin D level.    I would like to try her on the medication mirtazapine, 15 mg taken at bedtime.  I told her that mirtazapine helps with sleep, and it is also an antidepression medication.    Previous trial of Lexapro was not tolerated, because it made her lightheaded and dizzy.    09/21/21 42.5 kg (93 lb 12.8 oz)   01/09/20 47.7 kg (105 lb 1.6 oz)   01/02/20 47.8 kg (105 lb 6.4 oz)     Detailed work-up as documented by Dr. Hamilton Valdez of [place] [facility] in his note of April 28, 2020.  Evaluation for abdominal bloating and weight loss.  Unremarkable CBC, LFTs, lipase,  basic metabolic panel, TSH, H. pylori stool antigen.  Hepatitis B surface antigen negative.  Upper endoscopy with an unremarkable stomach and duodenum.  Biopsies of the stomach and duodenum negative.  Abdominal ultrasound unremarkable.  CT scan unremarkable as well.  Started on famotidine recommended 40 mg twice a day, also a trial of Beano dietary enzyme supplement for bloating.    Today's visit was assisted by the drew  via telephone.     Persistent epigastric discomfort, dyspepsia symptoms, weight loss, and early satiety.  No biochemical abnormalities or inflammatory markers demonstrated on blood tests that we performed on January 2, 2020.  Prolonged course of omeprazole 20 mg a day has not controlled her symptoms.    Longstanding anxiety symptoms, I suspect generalized anxiety disorder and agoraphobia; side effect of fainting when she tried Lexapro  She told me that she has had anxiety symptoms for maybe 3 decades.  She will experience sudden periods of anxiety that could be panic attacks.  She is never been on medication for this issue.    She consumes 0 alcohol.     Post menopause, history of what sounds like osteopenia.  She recalls having have a bone density measurement done in [place] about a decade ago, and was told that bone density was on the low side.  I have ordered a DEXA scan for her.    At the minimum, I would like her to start taking a calcium supplement every day, In the form of calcium citrate with vitamin D 1000 mg daily.    Other preventive services that we should eventually consider are mammography, colonoscopy, and gynecology (Pap smear)  Immunization History   Administered Date(s) Administered     DORIS ZHANG Janssen 08/13/2021       --------------------------------------------------------------------------------------------------------------------------  History of Present Illness  This 56 year old old    Follow-up for multiple issues, our last meeting was in the spring 2020,  and since then she has had ongoing symptoms of epigastric discomfort, all over body aches, back pain, abdominal pain, early satiety, and has continued to lose weight.  Today January 21 her weight is dropped to 93 pounds (42.5 kg) down from previous 105 pounds (47.7 kg).    I reviewed with her the extensive work-up performed in the spring 2020 here at [facility] and also at [place] [facility].    I believe that there are serious underlying issues of depression and anxiety.  She told me that she has been able to sleep only a couple hours at night, and this is been going on for months.  Furthermore, her symptoms of all over body aches would fit with fibromyalgia syndrome.    She told me today September 21, 2021 that she will be relocating to [facility] soon.  I am going to run a set of blood test today to make sure her core body systems are okay.  We will get a comprehensive metabolic panel, blood cell counts, thyroid cascade, sedimentation rate, C-reactive protein, and vitamin D level.    I would like to try her on the medication mirtazapine, 15 mg taken at bedtime.  I told her that mirtazapine helps with sleep, and it is also an antidepression medication.    Previous trial of Lexapro was not tolerated, because it made her lightheaded and dizzy.    09/21/21 42.5 kg (93 lb 12.8 oz)   01/09/20 47.7 kg (105 lb 1.6 oz)   01/02/20 47.8 kg (105 lb 6.4 oz)     Detailed work-up as documented by Dr. Hamilton Valdez of [place] [facility] in his note of April 28, 2020.  Evaluation for abdominal bloating and weight loss.  Unremarkable CBC, LFTs, lipase, basic metabolic panel, TSH, H. pylori stool antigen.  Hepatitis B surface antigen negative.  Upper endoscopy with an unremarkable stomach and duodenum.  Biopsies of the stomach and duodenum negative.  Abdominal ultrasound unremarkable.  CT scan unremarkable as well.  Started on famotidine recommended 40 mg twice a day, also a trial of Beano dietary enzyme supplement  for bloating.    Wt Readings from Last 3 Encounters:   09/21/21 42.5 kg (93 lb 12.8 oz)   01/09/20 47.7 kg (105 lb 1.6 oz)   01/02/20 47.8 kg (105 lb 6.4 oz)     BP Readings from Last 3 Encounters:   09/21/21 132/80   01/09/20 (!) 158/78   01/02/20 (!) 148/86     Lab Results   Component Value Date    WBC 7.2 01/02/2020    HGB 13.2 01/02/2020    HCT 38.3 01/02/2020     01/02/2020    ALT 13 01/02/2020    AST 18 01/02/2020     01/02/2020    BUN 19 01/02/2020    CO2 23 01/02/2020    TSH 1.08 01/02/2020    .  ---------------------------------------------------------------------------------------------------------------------------    Medications, Allergies, Social, and Problem List   Current Outpatient Medications   Medication Sig Dispense Refill     mirtazapine (REMERON) 15 MG tablet Take 1 tablet (15 mg) by mouth At Bedtime 30 tablet 11     valACYclovir (VALTREX) 1000 mg tablet Take 1 tablet (1,000 mg) by mouth 3 times daily 21 tablet 1     No Known Allergies  Social History     Tobacco Use     Smoking status: Never Smoker     Smokeless tobacco: Never Used   Substance Use Topics     Alcohol use: None     Drug use: None     Patient Active Problem List   Diagnosis     Menopause     SRIKANTH (generalized anxiety disorder)     Functional dyspepsia     Abdominal pain, epigastric     Early satiety     Weight loss        Reviewed, reconciled and updated       Physical Exam   General Appearance: In no distress, but she does look fatigued, has lost weight, pale complexion.    /80 (BP Location: Right arm, Patient Position: Sitting, Cuff Size: Adult Regular)   Pulse 98   Temp 98.7  F (37.1  C) (Oral)   Wt 42.5 kg (93 lb 12.8 oz)   SpO2 98%   BMI 17.72 kg/m      No abdominal tenderness.     Additional Information   I spent 30 minutes on this encounter, including reviewing interval history since last visit, examining the patient, explaining and counseling the issues enumerated in the Assessment and Plan (patient  given a copy), ordering indicated tests, ordering prescriptions       CHARLES POLLARD MD, MD

## 2021-09-21 NOTE — PATIENT INSTRUCTIONS
Follow-up for multiple issues, our last meeting was in the spring 2020, and since then she has had ongoing symptoms of epigastric discomfort, all over body aches, back pain, abdominal pain, early satiety, and has continued to lose weight.  Today January 21 her weight is dropped to 93 pounds (42.5 kg) down from previous 105 pounds (47.7 kg).    I reviewed with her the extensive work-up performed in the spring 2020 here at [facility] and also at [place] [facility].    I believe that there are serious underlying issues of depression and anxiety.  She told me that she has been able to sleep only a couple hours at night, and this is been going on for months.  Furthermore, her symptoms of all over body aches would fit with fibromyalgia syndrome.    She told me today September 21, 2021 that she will be relocating to [facility] soon.  I am going to run a set of blood test today to make sure her core body systems are okay.  We will get a comprehensive metabolic panel, blood cell counts, thyroid cascade, sedimentation rate, C-reactive protein, and vitamin D level.    I would like to try her on the medication mirtazapine, 15 mg taken at bedtime.  I told her that mirtazapine helps with sleep, and it is also an antidepression medication.    Previous trial of Lexapro was not tolerated, because it made her lightheaded and dizzy.    09/21/21 42.5 kg (93 lb 12.8 oz)   01/09/20 47.7 kg (105 lb 1.6 oz)   01/02/20 47.8 kg (105 lb 6.4 oz)     Detailed work-up as documented by Dr. Hamilton Valdez of [place] [facility] in his note of April 28, 2020.  Evaluation for abdominal bloating and weight loss.  Unremarkable CBC, LFTs, lipase, basic metabolic panel, TSH, H. pylori stool antigen.  Hepatitis B surface antigen negative.  Upper endoscopy with an unremarkable stomach and duodenum.  Biopsies of the stomach and duodenum negative.  Abdominal ultrasound unremarkable.  CT scan unremarkable as well.  Started on famotidine  recommended 40 mg twice a day, also a trial of Beano dietary enzyme supplement for bloating.    Today's visit was assisted by the drew  via telephone.     Persistent epigastric discomfort, dyspepsia symptoms, weight loss, and early satiety.  No biochemical abnormalities or inflammatory markers demonstrated on blood tests that we performed on January 2, 2020.  Prolonged course of omeprazole 20 mg a day has not controlled her symptoms.    Longstanding anxiety symptoms, I suspect generalized anxiety disorder and agoraphobia; side effect of fainting when she tried Lexapro  She told me that she has had anxiety symptoms for maybe 3 decades.  She will experience sudden periods of anxiety that could be panic attacks.  She is never been on medication for this issue.    She consumes 0 alcohol.     Post menopause, history of what sounds like osteopenia.  She recalls having have a bone density measurement done in [place] about a decade ago, and was told that bone density was on the low side.  I have ordered a DEXA scan for her.    At the minimum, I would like her to start taking a calcium supplement every day, In the form of calcium citrate with vitamin D 1000 mg daily.      Other preventive services that we should eventually consider are mammography, colonoscopy, and gynecology (Pap smear)    Immunization History   Administered Date(s) Administered     DORIS ZHANG Janssen 08/13/2021

## 2021-10-11 ENCOUNTER — HEALTH MAINTENANCE LETTER (OUTPATIENT)
Age: 56
End: 2021-10-11

## 2022-03-27 ENCOUNTER — HEALTH MAINTENANCE LETTER (OUTPATIENT)
Age: 57
End: 2022-03-27

## 2022-09-25 ENCOUNTER — HEALTH MAINTENANCE LETTER (OUTPATIENT)
Age: 57
End: 2022-09-25

## 2023-05-08 ENCOUNTER — HEALTH MAINTENANCE LETTER (OUTPATIENT)
Age: 58
End: 2023-05-08

## 2024-05-11 ENCOUNTER — HEALTH MAINTENANCE LETTER (OUTPATIENT)
Age: 59
End: 2024-05-11